# Patient Record
Sex: MALE | Race: BLACK OR AFRICAN AMERICAN | NOT HISPANIC OR LATINO | URBAN - METROPOLITAN AREA
[De-identification: names, ages, dates, MRNs, and addresses within clinical notes are randomized per-mention and may not be internally consistent; named-entity substitution may affect disease eponyms.]

---

## 2017-08-02 ENCOUNTER — INPATIENT (INPATIENT)
Facility: HOSPITAL | Age: 22
LOS: 2 days | Discharge: HOME | End: 2017-08-05
Attending: HOSPITALIST | Admitting: HOSPITALIST
Payer: MEDICAID

## 2017-08-02 VITALS
RESPIRATION RATE: 20 BRPM | OXYGEN SATURATION: 98 % | SYSTOLIC BLOOD PRESSURE: 99 MMHG | DIASTOLIC BLOOD PRESSURE: 45 MMHG | TEMPERATURE: 103 F | HEART RATE: 115 BPM

## 2017-08-02 DIAGNOSIS — B54 UNSPECIFIED MALARIA: ICD-10-CM

## 2017-08-02 DIAGNOSIS — Z29.9 ENCOUNTER FOR PROPHYLACTIC MEASURES, UNSPECIFIED: ICD-10-CM

## 2017-08-02 LAB
ALBUMIN SERPL ELPH-MCNC: 3.9 G/DL — SIGNIFICANT CHANGE UP (ref 3.3–5)
ALP SERPL-CCNC: 61 U/L — SIGNIFICANT CHANGE UP (ref 40–120)
ALT FLD-CCNC: 34 U/L — SIGNIFICANT CHANGE UP (ref 4–41)
APPEARANCE UR: CLEAR — SIGNIFICANT CHANGE UP
APTT BLD: 38.8 SEC — HIGH (ref 27.5–37.4)
AST SERPL-CCNC: 23 U/L — SIGNIFICANT CHANGE UP (ref 4–40)
B PERT DNA SPEC QL NAA+PROBE: SIGNIFICANT CHANGE UP
BASE EXCESS BLDV CALC-SCNC: 0.6 MMOL/L — SIGNIFICANT CHANGE UP
BASOPHILS # BLD AUTO: 0.01 K/UL — SIGNIFICANT CHANGE UP (ref 0–0.2)
BASOPHILS NFR BLD AUTO: 0.3 % — SIGNIFICANT CHANGE UP (ref 0–2)
BILIRUB SERPL-MCNC: 1.5 MG/DL — HIGH (ref 0.2–1.2)
BILIRUB UR-MCNC: NEGATIVE — SIGNIFICANT CHANGE UP
BLOOD GAS VENOUS - CREATININE: 1.13 MG/DL — SIGNIFICANT CHANGE UP (ref 0.5–1.3)
BLOOD UR QL VISUAL: HIGH
BUN SERPL-MCNC: 14 MG/DL — SIGNIFICANT CHANGE UP (ref 7–23)
C PNEUM DNA SPEC QL NAA+PROBE: NOT DETECTED — SIGNIFICANT CHANGE UP
CALCIUM SERPL-MCNC: 9.2 MG/DL — SIGNIFICANT CHANGE UP (ref 8.4–10.5)
CHLORIDE BLDV-SCNC: 104 MMOL/L — SIGNIFICANT CHANGE UP (ref 96–108)
CHLORIDE SERPL-SCNC: 100 MMOL/L — SIGNIFICANT CHANGE UP (ref 98–107)
CO2 SERPL-SCNC: 22 MMOL/L — SIGNIFICANT CHANGE UP (ref 22–31)
COLOR SPEC: YELLOW — SIGNIFICANT CHANGE UP
CREAT SERPL-MCNC: 1.13 MG/DL — SIGNIFICANT CHANGE UP (ref 0.5–1.3)
EOSINOPHIL # BLD AUTO: 0 K/UL — SIGNIFICANT CHANGE UP (ref 0–0.5)
EOSINOPHIL NFR BLD AUTO: 0 % — SIGNIFICANT CHANGE UP (ref 0–6)
FLUAV H1 2009 PAND RNA SPEC QL NAA+PROBE: NOT DETECTED — SIGNIFICANT CHANGE UP
FLUAV H1 RNA SPEC QL NAA+PROBE: NOT DETECTED — SIGNIFICANT CHANGE UP
FLUAV H3 RNA SPEC QL NAA+PROBE: NOT DETECTED — SIGNIFICANT CHANGE UP
FLUAV SUBTYP SPEC NAA+PROBE: SIGNIFICANT CHANGE UP
FLUBV RNA SPEC QL NAA+PROBE: NOT DETECTED — SIGNIFICANT CHANGE UP
GAS PNL BLDV: 133 MMOL/L — LOW (ref 136–146)
GLUCOSE BLDV-MCNC: 116 — HIGH (ref 70–99)
GLUCOSE SERPL-MCNC: 112 MG/DL — HIGH (ref 70–99)
GLUCOSE UR-MCNC: NEGATIVE — SIGNIFICANT CHANGE UP
HADV DNA SPEC QL NAA+PROBE: NOT DETECTED — SIGNIFICANT CHANGE UP
HCO3 BLDV-SCNC: 25 MMOL/L — SIGNIFICANT CHANGE UP (ref 20–27)
HCOV 229E RNA SPEC QL NAA+PROBE: NOT DETECTED — SIGNIFICANT CHANGE UP
HCOV HKU1 RNA SPEC QL NAA+PROBE: NOT DETECTED — SIGNIFICANT CHANGE UP
HCOV NL63 RNA SPEC QL NAA+PROBE: NOT DETECTED — SIGNIFICANT CHANGE UP
HCOV OC43 RNA SPEC QL NAA+PROBE: NOT DETECTED — SIGNIFICANT CHANGE UP
HCT VFR BLD CALC: 40.4 % — SIGNIFICANT CHANGE UP (ref 39–50)
HCT VFR BLDV CALC: 42.5 % — SIGNIFICANT CHANGE UP (ref 39–51)
HGB BLD-MCNC: 13.4 G/DL — SIGNIFICANT CHANGE UP (ref 13–17)
HGB BLDV-MCNC: 13.8 G/DL — SIGNIFICANT CHANGE UP (ref 13–17)
HIV1 AG SER QL: SIGNIFICANT CHANGE UP
HIV1+2 AB SPEC QL: SIGNIFICANT CHANGE UP
HMPV RNA SPEC QL NAA+PROBE: NOT DETECTED — SIGNIFICANT CHANGE UP
HPIV1 RNA SPEC QL NAA+PROBE: NOT DETECTED — SIGNIFICANT CHANGE UP
HPIV2 RNA SPEC QL NAA+PROBE: NOT DETECTED — SIGNIFICANT CHANGE UP
HPIV3 RNA SPEC QL NAA+PROBE: NOT DETECTED — SIGNIFICANT CHANGE UP
HPIV4 RNA SPEC QL NAA+PROBE: NOT DETECTED — SIGNIFICANT CHANGE UP
IMM GRANULOCYTES # BLD AUTO: 0.02 # — SIGNIFICANT CHANGE UP
IMM GRANULOCYTES NFR BLD AUTO: 0.5 % — SIGNIFICANT CHANGE UP (ref 0–1.5)
INR BLD: 1.35 — HIGH (ref 0.88–1.17)
KETONES UR-MCNC: NEGATIVE — SIGNIFICANT CHANGE UP
LACTATE BLDV-MCNC: 2.2 MMOL/L — HIGH (ref 0.5–2)
LEUKOCYTE ESTERASE UR-ACNC: NEGATIVE — SIGNIFICANT CHANGE UP
LYMPHOCYTES # BLD AUTO: 0.33 K/UL — LOW (ref 1–3.3)
LYMPHOCYTES # BLD AUTO: 8.3 % — LOW (ref 13–44)
M PNEUMO DNA SPEC QL NAA+PROBE: NOT DETECTED — SIGNIFICANT CHANGE UP
MCHC RBC-ENTMCNC: 26.3 PG — LOW (ref 27–34)
MCHC RBC-ENTMCNC: 33.2 % — SIGNIFICANT CHANGE UP (ref 32–36)
MCV RBC AUTO: 79.2 FL — LOW (ref 80–100)
MONOCYTES # BLD AUTO: 0.6 K/UL — SIGNIFICANT CHANGE UP (ref 0–0.9)
MONOCYTES NFR BLD AUTO: 15.1 % — HIGH (ref 2–14)
MUCOUS THREADS # UR AUTO: SIGNIFICANT CHANGE UP
NEUTROPHILS # BLD AUTO: 3.02 K/UL — SIGNIFICANT CHANGE UP (ref 1.8–7.4)
NEUTROPHILS NFR BLD AUTO: 75.8 % — SIGNIFICANT CHANGE UP (ref 43–77)
NITRITE UR-MCNC: NEGATIVE — SIGNIFICANT CHANGE UP
NRBC # FLD: 0 — SIGNIFICANT CHANGE UP
PCO2 BLDV: 37 MMHG — LOW (ref 41–51)
PH BLDV: 7.43 PH — SIGNIFICANT CHANGE UP (ref 7.32–7.43)
PH UR: 6.5 — SIGNIFICANT CHANGE UP (ref 4.6–8)
PLASMODIUM AG BLD QL: SIGNIFICANT CHANGE UP
PLATELET # BLD AUTO: 125 K/UL — LOW (ref 150–400)
PMV BLD: 11.3 FL — SIGNIFICANT CHANGE UP (ref 7–13)
PO2 BLDV: 62 MMHG — HIGH (ref 35–40)
POTASSIUM BLDV-SCNC: 3.4 MMOL/L — SIGNIFICANT CHANGE UP (ref 3.4–4.5)
POTASSIUM SERPL-MCNC: 3.5 MMOL/L — SIGNIFICANT CHANGE UP (ref 3.5–5.3)
POTASSIUM SERPL-SCNC: 3.5 MMOL/L — SIGNIFICANT CHANGE UP (ref 3.5–5.3)
PROT SERPL-MCNC: 7.8 G/DL — SIGNIFICANT CHANGE UP (ref 6–8.3)
PROT UR-MCNC: 30 — SIGNIFICANT CHANGE UP
PROTHROM AB SERPL-ACNC: 15.2 SEC — HIGH (ref 9.8–13.1)
RBC # BLD: 5.1 M/UL — SIGNIFICANT CHANGE UP (ref 4.2–5.8)
RBC # FLD: 13.4 % — SIGNIFICANT CHANGE UP (ref 10.3–14.5)
RBC CASTS # UR COMP ASSIST: SIGNIFICANT CHANGE UP (ref 0–?)
RSV RNA SPEC QL NAA+PROBE: NOT DETECTED — SIGNIFICANT CHANGE UP
RV+EV RNA SPEC QL NAA+PROBE: NOT DETECTED — SIGNIFICANT CHANGE UP
SAO2 % BLDV: 91.7 % — HIGH (ref 60–85)
SODIUM SERPL-SCNC: 139 MMOL/L — SIGNIFICANT CHANGE UP (ref 135–145)
SP GR SPEC: 1.02 — SIGNIFICANT CHANGE UP (ref 1–1.03)
SQUAMOUS # UR AUTO: SIGNIFICANT CHANGE UP
UROBILINOGEN FLD QL: 4 E.U. — HIGH (ref 0.1–0.2)
WBC # BLD: 3.98 K/UL — SIGNIFICANT CHANGE UP (ref 3.8–10.5)
WBC # FLD AUTO: 3.98 K/UL — SIGNIFICANT CHANGE UP (ref 3.8–10.5)
WBC UR QL: SIGNIFICANT CHANGE UP (ref 0–?)

## 2017-08-02 PROCEDURE — 71020: CPT | Mod: 26

## 2017-08-02 PROCEDURE — 74177 CT ABD & PELVIS W/CONTRAST: CPT | Mod: 26

## 2017-08-02 RX ORDER — ONDANSETRON 8 MG/1
4 TABLET, FILM COATED ORAL ONCE
Qty: 0 | Refills: 0 | Status: COMPLETED | OUTPATIENT
Start: 2017-08-02 | End: 2017-08-02

## 2017-08-02 RX ORDER — ACETAMINOPHEN 500 MG
650 TABLET ORAL ONCE
Qty: 0 | Refills: 0 | Status: COMPLETED | OUTPATIENT
Start: 2017-08-02 | End: 2017-08-02

## 2017-08-02 RX ORDER — ACETAMINOPHEN 500 MG
650 TABLET ORAL EVERY 6 HOURS
Qty: 0 | Refills: 0 | Status: DISCONTINUED | OUTPATIENT
Start: 2017-08-02 | End: 2017-08-05

## 2017-08-02 RX ORDER — ATOVAQUONE/PROGUANIL HCL 250-100 MG
4 TABLET ORAL
Qty: 0 | Refills: 0 | Status: COMPLETED | OUTPATIENT
Start: 2017-08-03 | End: 2017-08-04

## 2017-08-02 RX ORDER — ATOVAQUONE/PROGUANIL HCL 250-100 MG
4 TABLET ORAL ONCE
Qty: 0 | Refills: 0 | Status: COMPLETED | OUTPATIENT
Start: 2017-08-02 | End: 2017-08-02

## 2017-08-02 RX ORDER — MORPHINE SULFATE 50 MG/1
4 CAPSULE, EXTENDED RELEASE ORAL ONCE
Qty: 0 | Refills: 0 | Status: DISCONTINUED | OUTPATIENT
Start: 2017-08-02 | End: 2017-08-02

## 2017-08-02 RX ORDER — SODIUM CHLORIDE 9 MG/ML
1000 INJECTION INTRAMUSCULAR; INTRAVENOUS; SUBCUTANEOUS ONCE
Qty: 0 | Refills: 0 | Status: COMPLETED | OUTPATIENT
Start: 2017-08-02 | End: 2017-08-02

## 2017-08-02 RX ADMIN — Medication 4 TABLET(S): at 17:39

## 2017-08-02 RX ADMIN — Medication 650 MILLIGRAM(S): at 14:01

## 2017-08-02 RX ADMIN — MORPHINE SULFATE 4 MILLIGRAM(S): 50 CAPSULE, EXTENDED RELEASE ORAL at 17:39

## 2017-08-02 RX ADMIN — ONDANSETRON 4 MILLIGRAM(S): 8 TABLET, FILM COATED ORAL at 18:27

## 2017-08-02 RX ADMIN — SODIUM CHLORIDE 1000 MILLILITER(S): 9 INJECTION INTRAMUSCULAR; INTRAVENOUS; SUBCUTANEOUS at 13:41

## 2017-08-02 NOTE — ED PROVIDER NOTE - NEUROLOGICAL, MLM
Alert and oriented, no focal deficits, no motor or sensory deficits. No nuchal rigidity, neg kurnig and brudzinski

## 2017-08-02 NOTE — H&P ADULT - PROBLEM SELECTOR PLAN 1
-ID consulted, recommended malarone  -Blood cultures pending  -Tylenol PRN for fever  -Monitor daily parasite count -ID consulted and recommended malarone  -Blood cultures pending  -Tylenol PRN for fever  -Monitor daily parasite count -ID consulted and recommended malarone  -Blood cultures pending  -Tylenol PRN for fever and headache  -Monitor daily parasite count

## 2017-08-02 NOTE — ED PROVIDER NOTE - MEDICAL DECISION MAKING DETAILS
22 M recent return from [facility] one week ago, p/w fever x 3 days, 3 episodes of vomiting today, no other symptoms. Mildly tachcyardic with soft BP. No focal findings on exam, no nuchal rigidity. concern for malaria given travel and close contacts abroad with malaria and no other obvious source. Will check labs, malaria screen, cxr, ua, rvp, tylenol, ivf and d/w ID

## 2017-08-02 NOTE — ED PROVIDER NOTE - MUSCULOSKELETAL, MLM
Spine appears normal, range of motion is not limited, bilat lower back tenderness, no spinal tenderness, no CVA tenderness

## 2017-08-02 NOTE — H&P ADULT - NSHPREVIEWOFSYSTEMS_GEN_ALL_CORE
CONSTITUTIONAL:  +fever, chills, generalized malaise  HEENT:  Eyes:  No visual loss, blurred vision, double vision or yellow sclerae. Ears, Nose, Throat:  No hearing loss, sneezing, congestion, runny nose or sore throat.  SKIN:  No rash or itching.  CARDIOVASCULAR:  No chest pain, chest pressure or chest discomfort. No palpitations or edema.  RESPIRATORY:  No shortness of breath, cough or sputum.  GASTROINTESTINAL:  +generalized abdominal pain, nausea, vomiting  GENITOURINARY:  Denies hematuria, dysuria.   NEUROLOGICAL:  +headache  MUSCULOSKELETAL:  No muscle, back pain, joint pain or stiffness. +flank pain b/l  HEMATOLOGIC:  No anemia, bleeding or bruising.

## 2017-08-02 NOTE — ED PROVIDER NOTE - PROGRESS NOTE DETAILS
spoke with ID fellow, will see patient and give advice re any additional testing +malaria screen, d/w ID, will see pt in ED on re-examination patient noted to have RLQ tenderness on exam. will get CT. Patient seen by ID recommend admission and malarone 4 tabs daily x 3 days

## 2017-08-02 NOTE — H&P ADULT - HISTORY OF PRESENT ILLNESS
22 M with no PMH p/w 2 day hx of fevers, headaches, and abdominal pain. Pt did not measure his temperature at home, but felt warm with generalized malaise. He recently returned from a trip to his home in [facility] to visit his family. His mother was reportedly sick with malaria and treated with anti-malarials. He did not take any malaria ppx while there. He endorses a frontal headache associated with his fevers, no blurry vision, photophobia, or nuchal rigidity. Has also had few episodes of NBNB vomiting while at home. Abdominal pain is generalized with radiation to his flanks bilaterally. Bowel movements brown in color with normal consistency, denies melena or hematochezia. Took advil for fever at home. Felt much better at time of this encounter. Denies any cough, SOB, rhinorrhea, sore throat, chest pain, diarrhea or rash.        Vital Signs Last 24 Hrs  T(C): 37.6 (02 Aug 2017 21:12), Max: 39.4 (02 Aug 2017 12:11)  T(F): 99.6 (02 Aug 2017 21:12), Max: 103 (02 Aug 2017 12:11)  HR: 78 (02 Aug 2017 21:12) (78 - 115)  BP: 105/64 (02 Aug 2017 21:12) (99/45 - 117/61)  RR: 16 (02 Aug 2017 21:12) (16 - 20)  SpO2: 100% (02 Aug 2017 21:12) (98% - 100%)    Pt given 1L bolus in the ED

## 2017-08-02 NOTE — CONSULT NOTE ADULT - SUBJECTIVE AND OBJECTIVE BOX
HPI:  22M from [facility] since .  Lives in [place] now.  Student/[employer] .  Goes annually to [facility] to visit friends/family.  Last went from 17 to 17.  Since 2 days ago, he has been feeling ill with fever/headache.  One episode of vomiting.  Rest of ROS negative.  Here 103 fever.  Blood sent for smear which reveal intraerythrocytic ring forms c/w malaria.  We reviewed smear with parasitology.  About 2.5% parasitemia.  No LUCITA, CXR negative.  Not confused.       PAST MEDICAL & SURGICAL HISTORY:  Malaria    Allergies  No Known Allergies    ANTIMICROBIALS: none    OTHER MEDS: MEDICATIONS  (STANDING):  morphine  - Injectable 4 Once    SOCIAL HISTORY:   no IVDU. has donated blood in past.  sexually active but always uses condoms    FAMILY HISTORY:  malaria.  no TB    REVIEW OF SYSTEMS  [  ] ROS unobtainable because:    [ x ] All other systems negative except as noted below:	    Constitutional:  [x ] fever [x] decreased appetite  Skin:  [ ] rash [ ] phlebitis	  Eyes: [ ] icterus [ ] inflammation	  ENMT: [ ] discharge [ ] thrush [ ] ulcers [ ] exudates  Respiratory: [ ] dyspnea [ ] hemoptysis [ ] cough [ ] sputum	  Cardiovascular:  [ ] chest pain [ ] palpitations [ ] edema	  Gastrointestinal:  [ ] nausea [ x] vomiting [ ] diarrhea [ ] constipation [x ] pain	  Genitourinary:  [ ] dysuria [ ] frequency [ ] hematuria [ ] discharge [ ] flank pain  Musculoskeletal:  [ ] myalgias [ ] arthralgias [ ] arthritis	  Neurological:  [x ] headache [ ] seizures	 [x] photosensitivity  Psychiatric:  [ ] anxiety [ ] depression	  Hematology/Lymphatics:  [ ] lymphadenopathy  Endocrine:  [ ] adrenal [ ] thyroid  Allergic/Immunologic:	 [ ] transplant [ ] seasonal    Vital Signs Last 24 Hrs  T(F): 99.6 (17 @ 15:25), Max: 103 (17 @ 12:11)  HR: 82 (17 @ 15:25) (82 - 115)  BP: 114/62 (17 @ 15:25) (99/45 - 114/62)  RR: 18 (17 @ 15:25)  SpO2: 99% (17 @ 15:25) (98% - 99%)  Wt(kg): --    PHYSICAL EXAM:  General: non-toxic avoiding the light  HEAD/EYES: anicteric, PERRL  ENT:  supple  Cardiovascular:   S1, S2  Respiratory:  clear bilaterally  GI:  soft, normal bowel sounds, RLQ tenderness  :  no CVA tenderness   Musculoskeletal:  no synovitis  Neurologic:  grossly non-focal  Skin:  no rash  Lymph: no lymphadenopathy  Psychiatric:  appropriate affect  Vascular:  no phlebitis                        13.4   3.98  )-----------( 125      ( 02 Aug 2017 13:15 )             40.4     139  |  100  |  14  ----------------------------<  112<H>  3.5   |  22  |  1.13    Ca    9.2      02 Aug 2017 13:15    TBili  1.5  AST  23  ALT  34  AlkPhos  61    Urinalysis Basic - ( 02 Aug 2017 15:40 )  Color: YELLOW / Appearance: CLEAR / S.018 / pH: 6.5  Gluc: NEGATIVE / Ketone: NEGATIVE  / Bili: NEGATIVE / Urobili: 4 E.U.   Blood: MODERATE / Protein: 30 / Nitrite: NEGATIVE   Leuk Esterase: NEGATIVE / RBC: 10-25 / WBC 2-5   Sq Epi: OCC / Non Sq Epi: x / Bacteria: x        MICROBIOLOGY:    Malaria Screening: PARASITES SEEN (17 @ 13:25)        RADIOLOGY:   Xray Chest 2 Views PA/Lat (17 @ 14:19)  IMPRESSION: Clear lungs.

## 2017-08-02 NOTE — H&P ADULT - NSHPPHYSICALEXAM_GEN_ALL_CORE
GENERAL APPEARANCE: Well developed, well nourished, alert and cooperative. NAD.   HEENT:  NC/AT, clear conjunctiva  NECK: Neck supple, non-tender without lymphadenopathy, masses  CARDIAC: Normal S1 and S2. No S3, S4 or murmurs. RRR  LUNGS: Clear to auscultation and percussion without rales, rhonchi, wheezing or diminished breath sounds.  ABDOMEN: Soft, nondistended. Mild tenderness to palpation diffusely which patient reports is much improved.   MUSKULOSKELETAL: No joint erythema or tenderness.   EXTREMITIES: No edema.  NEUROLOGICAL: No focal neurologic deficit. Strength and sensation symmetric and intact throughout.   SKIN: Skin clean, dry, intact  PSYCHIATRIC: AOx3.Normal affect and behavior.

## 2017-08-02 NOTE — ED ADULT TRIAGE NOTE - CHIEF COMPLAINT QUOTE
pt presents via ems for evaluation of fevers, chills, flu-like symptoms, muscle aches, unsteady gait and back pain since monday after returning back from [place] x 1 week ago. denies any additional symptoms. denies any medical history.

## 2017-08-02 NOTE — ED PROVIDER NOTE - ATTENDING CONTRIBUTION TO CARE
MD Patton:  patient seen and evaluated with the resident.  I was present for key portions of the History & Physical, and I agree with the Impression & Plan.  MD Patton:  23 yo M, c/o cyclical fever.  Worried he has malaria.  No N/V.  No CP/SOB.  No cough.  No abd pain.  Mother has malaria.  Just traveled back to [facility] from a [place]endemic [place].  Bloodwork demonstrates active malaria infection w/o evidence for active hemolysis.  Impression:  active malaria infection.  Plan:  admit, antimicrobials as per ID recs.

## 2017-08-02 NOTE — ED PROVIDER NOTE - OBJECTIVE STATEMENT
22 M no sig pmh, recently returned from [facility] 1 week ago after spending 5 weeks there, and p/w fever x 3 days. Patient says was feeling well while away. No fever there, no abd pain, vomiting, diarrhea. Did not take any ppx for malaria and did not use mosquito net. mother in [facility] with malaria. [place] well when he return then 3 days ago developed chills at home. Did not take temp. When feels fever, also has frontal headache. Patient also c/o lower back pain, bilateral, better when laying flat. Fever improves with advil, but then returns. has not noticed any cyclical pattern. Had 3 episodes of emesis today. No nausea, no vomiting, no abdominal pain, no diarrhea, no cough, no dysuria/hematuria, no neck pain or stiffness, no blurry vision. Denies unprotected sex or needle use. 22 M no sig pmh, recently returned from [facility] 1 week ago after spending 5 weeks there, and p/w fever x 3 days. Patient says was feeling well while away. No fever there, no abd pain, vomiting, diarrhea. Did not take any ppx for malaria and did not use mosquito net. mother in [facility] with malaria. [place] well when he return then 3 days ago developed chills at home. Did not take temp. When feels fever, also has frontal headache. Patient also c/o lower back pain, bilateral, better when laying flat. Fever improves with advil, but then returns. has not noticed any cyclical pattern. Had 3 episodes of emesis today. No nausea, no vomiting, no abdominal pain, no diarrhea, no cough, no dysuria/hematuria, no neck pain or stiffness, no blurry vision. Denies unprotected sex or needle use. no rashes.

## 2017-08-02 NOTE — H&P ADULT - NSHPLABSRESULTS_GEN_ALL_CORE
13.4   3.98  )-----------( 125      ( 02 Aug 2017 13:15 )             40.4     malaria screen revealed: 2.5% parasitemia PLASMODIUM.FALCIPARUM        139  |  100  |  14  ----------------------------<  112<H>  3.5   |  22  |  1.13    Ca    9.2      02 Aug 2017 13:15    TPro  7.8  /  Alb  3.9  /  TBili  1.5<H>  /  DBili  x   /  AST  23  /  ALT  34  /  AlkPhos  61  08    LIVER FUNCTIONS - ( 02 Aug 2017 13:15 )  Alb: 3.9 g/dL / Pro: 7.8 g/dL / ALK PHOS: 61 u/L / ALT: 34 u/L / AST: 23 u/L / GGT: x           Lactate of 2.2    Urinalysis Basic - ( 02 Aug 2017 15:40 )    Color: YELLOW / Appearance: CLEAR / S.018 / pH: 6.5  Gluc: NEGATIVE / Ketone: NEGATIVE  / Bili: NEGATIVE / Urobili: 4 E.U.   Blood: MODERATE / Protein: 30 / Nitrite: NEGATIVE   Leuk Esterase: NEGATIVE / RBC: 10-25 / WBC 2-5   Sq Epi: OCC / Non Sq Epi: x / Bacteria: x      PT/INR - ( 02 Aug 2017 13:15 )   PT: 15.2 SEC;   INR: 1.35       PTT - ( 02 Aug 2017 13:15 )  PTT:38.8 SEC    RVP negative    CT A/P: Appendix is within normal limits. A radiopaque density within the appendix may represent ingested material versus an appendicolith.

## 2017-08-02 NOTE — CONSULT NOTE ADULT - ASSESSMENT
22M originally from [facility] with malaria - suspect falciparum, uncomplicated secondary to VFR without prophylaxis.  Cannot r/u underlying appendicitis given exam findings.    suggest:  malarone  CT abdomen/pelvis  f/u blood cultures  f/u RVP    d/w ER resident

## 2017-08-02 NOTE — ED ADULT NURSE NOTE - OBJECTIVE STATEMENT
Pt presents to room 17, A&Ox3, ambulatory at baseline without assistance, coming in for evaluation of fevers and left sided back pain x 2 days.  Reports constant back pain for the past 2 days, mild relief with advil.  denies any urinary symptoms, cough, neck pain, dizziness, joint pain, chest pain, shortness of breath.  Pt denies any medical hx.  Just returned from [place] one week prior, pts mother currently ill with malaria.  denies any mosquito bites.  IV established in left ac with a 20g, labs drawn and sent, call bell in reach, side rails up, bed in locked position, md evaluation in progress, pt on telemetry-ST @ 110 noted, will continue to monitor.

## 2017-08-03 LAB
ALBUMIN SERPL ELPH-MCNC: 3.6 G/DL — SIGNIFICANT CHANGE UP (ref 3.3–5)
ALP SERPL-CCNC: 56 U/L — SIGNIFICANT CHANGE UP (ref 40–120)
ALT FLD-CCNC: 37 U/L — SIGNIFICANT CHANGE UP (ref 4–41)
ANISOCYTOSIS BLD QL: SLIGHT — SIGNIFICANT CHANGE UP
AST SERPL-CCNC: 25 U/L — SIGNIFICANT CHANGE UP (ref 4–40)
BILIRUB SERPL-MCNC: 0.7 MG/DL — SIGNIFICANT CHANGE UP (ref 0.2–1.2)
BUN SERPL-MCNC: 10 MG/DL — SIGNIFICANT CHANGE UP (ref 7–23)
CALCIUM SERPL-MCNC: 9.3 MG/DL — SIGNIFICANT CHANGE UP (ref 8.4–10.5)
CHLORIDE SERPL-SCNC: 100 MMOL/L — SIGNIFICANT CHANGE UP (ref 98–107)
CO2 SERPL-SCNC: 28 MMOL/L — SIGNIFICANT CHANGE UP (ref 22–31)
CREAT SERPL-MCNC: 0.98 MG/DL — SIGNIFICANT CHANGE UP (ref 0.5–1.3)
EOSINOPHIL NFR FLD: 1.8 % — SIGNIFICANT CHANGE UP (ref 0–6)
GIANT PLATELETS BLD QL SMEAR: PRESENT — SIGNIFICANT CHANGE UP
GLUCOSE SERPL-MCNC: 97 MG/DL — SIGNIFICANT CHANGE UP (ref 70–99)
HCT VFR BLD CALC: 40.1 % — SIGNIFICANT CHANGE UP (ref 39–50)
HGB BLD-MCNC: 13.1 G/DL — SIGNIFICANT CHANGE UP (ref 13–17)
HYPOCHROMIA BLD QL: SLIGHT — SIGNIFICANT CHANGE UP
LACTATE SERPL-SCNC: 1 MMOL/L — SIGNIFICANT CHANGE UP (ref 0.5–2)
LYMPHOCYTES NFR SPEC AUTO: 42.5 % — SIGNIFICANT CHANGE UP (ref 13–44)
MCHC RBC-ENTMCNC: 26.6 PG — LOW (ref 27–34)
MCHC RBC-ENTMCNC: 32.7 % — SIGNIFICANT CHANGE UP (ref 32–36)
MCV RBC AUTO: 81.5 FL — SIGNIFICANT CHANGE UP (ref 80–100)
MICROCYTES BLD QL: SIGNIFICANT CHANGE UP
MONOCYTES NFR BLD: 18.6 % — HIGH (ref 2–9)
NEUTROPHIL AB SER-ACNC: 26.5 % — LOW (ref 43–77)
NRBC # FLD: 0 — SIGNIFICANT CHANGE UP
PARASITE BLD: PRESENT — SIGNIFICANT CHANGE UP
PLASMODIUM AG BLD QL: SIGNIFICANT CHANGE UP
PLATELET # BLD AUTO: 89 K/UL — LOW (ref 150–400)
PLATELET COUNT - ESTIMATE: SIGNIFICANT CHANGE UP
PMV BLD: SIGNIFICANT CHANGE UP FL (ref 7–13)
POIKILOCYTOSIS BLD QL AUTO: SLIGHT — SIGNIFICANT CHANGE UP
POTASSIUM SERPL-MCNC: 4 MMOL/L — SIGNIFICANT CHANGE UP (ref 3.5–5.3)
POTASSIUM SERPL-SCNC: 4 MMOL/L — SIGNIFICANT CHANGE UP (ref 3.5–5.3)
PROT SERPL-MCNC: 7.5 G/DL — SIGNIFICANT CHANGE UP (ref 6–8.3)
RBC # BLD: 4.92 M/UL — SIGNIFICANT CHANGE UP (ref 4.2–5.8)
RBC # FLD: SIGNIFICANT CHANGE UP % (ref 10.3–14.5)
SMUDGE CELLS # BLD: PRESENT — SIGNIFICANT CHANGE UP
SODIUM SERPL-SCNC: 137 MMOL/L — SIGNIFICANT CHANGE UP (ref 135–145)
SPECIMEN SOURCE: SIGNIFICANT CHANGE UP
SPECIMEN SOURCE: SIGNIFICANT CHANGE UP
VARIANT LYMPHS # FLD: 6.2 % — SIGNIFICANT CHANGE UP
WBC # BLD EST: SIGNIFICANT CHANGE UP
WBC # BLD: 2.1 K/UL — LOW (ref 3.8–10.5)
WBC # FLD AUTO: 2.1 K/UL — LOW (ref 3.8–10.5)

## 2017-08-03 PROCEDURE — 99223 1ST HOSP IP/OBS HIGH 75: CPT | Mod: GC

## 2017-08-03 PROCEDURE — 99232 SBSQ HOSP IP/OBS MODERATE 35: CPT

## 2017-08-03 PROCEDURE — 85060 BLOOD SMEAR INTERPRETATION: CPT

## 2017-08-03 PROCEDURE — 12345: CPT | Mod: GC,NC

## 2017-08-03 RX ADMIN — Medication 4 TABLET(S): at 17:37

## 2017-08-03 NOTE — PROGRESS NOTE ADULT - PROBLEM SELECTOR PLAN 1
-ID consulted and recommended malarone  -Blood cultures pending  -RVP neg  -Tylenol PRN for fever and headache  -Monitor daily parasite count

## 2017-08-03 NOTE — DISCHARGE NOTE ADULT - CONDITIONS AT DISCHARGE
Patient a&ox4, VSS, no acute distress noted. No s/s of pain noted at this time. Skin intact. Patient to be d/c home

## 2017-08-03 NOTE — DISCHARGE NOTE ADULT - PATIENT PORTAL LINK FT
“You can access the [facility] Patient Portal, offered by [facility], by registering with the following website: [email or web address]”

## 2017-08-03 NOTE — DISCHARGE NOTE ADULT - CARE PLAN
Principal Discharge DX:	Malaria  Goal:	To treat the malaria infection  Instructions for follow-up, activity and diet:	Regular diet, resume regular activity as tolerated Principal Discharge DX:	Malaria  Goal:	To treat the malaria infection  Instructions for follow-up, activity and diet:	You completed the treatment for your malaria infection.

## 2017-08-03 NOTE — DISCHARGE NOTE ADULT - HOSPITAL COURSE
22 M with no PMH p/w 2 day hx of fevers, headaches, and abdominal pain. Pt recently returned from a trip to his home in [place] [facility] to visit his family. His mother was reportedly sick with malaria and treated with anti-malarials. He did not take any malaria ppx while there. In the ED, pt had a fever to 103 and recieved 1 L of fluid. 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 Infectious disease was consulted and malarone was started.  Pt has remained afebrile since fever in the ED and improved clinically. Pt will finish a 3 day course of Malarone. Pt is stable and ready for discharge. 22 M with no PMH p/w 2 day hx of fevers, headaches, and abdominal pain. Pt recently returned from a trip to his home in [facility] to visit his family. His mother was reportedly sick with malaria and treated with anti-malarials. He did not take any malaria ppx while there. In the ED, pt had a fever to 103 and recieved 1 L of fluid. 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 Infectious disease was consulted and malarone was started.  Pt spiked fever to 101.5 day before discharge with symptoms of chills of HA. Parasite load at 0.05% on day of discharge and symptoms resolved. Completed a 3 day course of malarone per ID recs. Pt is stable and ready for discharge.

## 2017-08-03 NOTE — DISCHARGE NOTE ADULT - PLAN OF CARE
To treat the malaria infection Regular diet, resume regular activity as tolerated You completed the treatment for your malaria infection.

## 2017-08-03 NOTE — PROGRESS NOTE ADULT - SUBJECTIVE AND OBJECTIVE BOX
Patient is a 22y old  Male who presents with a chief complaint of Fever, headaches, abdominal pain  -  MALARIA (03 Aug 2017 00:46)      SUBJECTIVE / OVERNIGHT EVENTS: No acute events. Pt feels well. Denies any: Fevers, chills, SOB, n/v/d.    MEDICATIONS  (STANDING):  atovaquone 250 mG/proguanil 100 mG 4 Tablet(s) Oral <User Schedule>    MEDICATIONS  (PRN):  acetaminophen   Tablet 650 milliGRAM(s) Oral every 6 hours PRN For Temp greater than 38 C (100.4 F)        CAPILLARY BLOOD GLUCOSE        I&O's Summary      PHYSICAL EXAM:  GENERAL: NAD, well-developed  HEAD:  Atraumatic, Normocephalic  EYES: EOMI, PERRLA, conjunctiva and sclera clear  NECK: Supple, No JVD  CHEST/LUNG: Clear to auscultation bilaterally; No wheeze  HEART: Regular rate and rhythm; No murmurs, rubs, or gallops  ABDOMEN: Soft, Nontender, Nondistended; Bowel sounds present  EXTREMITIES:  2+ Peripheral Pulses, No clubbing, cyanosis, or edema  PSYCH: AAOx3  NEUROLOGY: non-focal  SKIN: No rashes or lesions    LABS:                        13.1   2.10  )-----------( 89       ( 03 Aug 2017 06:05 )             40.1     08-03    137  |  100  |  10  ----------------------------<  97  4.0   |  28  |  0.98    Ca    9.3      03 Aug 2017 06:05    TPro  7.5  /  Alb  3.6  /  TBili  0.7  /  DBili  x   /  AST  25  /  ALT  37  /  AlkPhos  56  08-03    PT/INR - ( 02 Aug 2017 13:15 )   PT: 15.2 SEC;   INR: 1.35          PTT - ( 02 Aug 2017 13:15 )  PTT:38.8 SEC      Urinalysis Basic - ( 02 Aug 2017 15:40 )    Color: YELLOW / Appearance: CLEAR / S.018 / pH: 6.5  Gluc: NEGATIVE / Ketone: NEGATIVE  / Bili: NEGATIVE / Urobili: 4 E.U.   Blood: MODERATE / Protein: 30 / Nitrite: NEGATIVE   Leuk Esterase: NEGATIVE / RBC: 10-25 / WBC 2-5   Sq Epi: OCC / Non Sq Epi: x / Bacteria: x        RADIOLOGY & ADDITIONAL TESTS:    Imaging Personally Reviewed:    Consultant(s) Notes Reviewed:      Care Discussed with Consultants/Other Providers:

## 2017-08-03 NOTE — PROGRESS NOTE ADULT - SUBJECTIVE AND OBJECTIVE BOX
f/u malaria    interval history/ROS:  fortunately, the CT did not confirm appendicitis.  patient reports that he vomited an hour after taking the first dose of malarone.  no abdominal pain today.  no n/v/d.  fever better and headache resolved    Allergies  No Known Allergies    ANTIMICROBIALS:   atovaquone 250 mG/proguanil 100 mG 4 <User Schedule>    MEDICATIONS  (STANDING):  acetaminophen   Tablet 650 every 6 hours PRN    Vital Signs Last 24 Hrs  T(F): 97.4 (08-03-17 @ 06:29), Max: 99.6 (08-02-17 @ 15:25)  HR: 70 (08-03-17 @ 06:29)  BP: 101/64 (08-03-17 @ 06:29)  RR: 18 (08-03-17 @ 06:29)  SpO2: 100% (08-03-17 @ 06:29) (99% - 100%)  Wt(kg): --    PHYSICAL EXAM:  General: non-toxic  HEAD/EYES: anicteric, PERRL  ENT:  supple  Cardiovascular:   S1, S2  Respiratory:  clear bilaterally  GI:  soft, normal bowel sounds, NO TENDERNESS  :  no CVA tenderness   Musculoskeletal:  no synovitis  Neurologic:  grossly non-focal  Skin:  no rash  Lymph: no lymphadenopathy  Psychiatric:  appropriate affect  Vascular:  no phlebitis                        13.1   2.10  )-----------( 89       ( 03 Aug 2017 06:05 )             40.1 08-03    137  |  100  |  10  ----------------------------<  97  4.0   |  28  |  0.98  Ca    9.3      03 Aug 2017 06:05  TPro  7.5  /  Alb  3.6  /  TBili  0.7  /  DBili  x   /  AST  25  /  ALT  37  /  AlkPhos  56  08-03    MICROBIOLOGY:  Malaria Screening: PARASITES SEEN (08.02.17 @ 13:25)    RADIOLOGY:   CT Abdomen and Pelvis w/ IV Cont (08.02.17 @ 19:17)  IMPRESSION:  Appendix is within normal limits. A radiopaque density within the appendix may represent ingested material versus an appendicolith.

## 2017-08-04 DIAGNOSIS — R10.9 UNSPECIFIED ABDOMINAL PAIN: ICD-10-CM

## 2017-08-04 LAB
ANISOCYTOSIS BLD QL: SLIGHT — SIGNIFICANT CHANGE UP
BACTERIA UR CULT: SIGNIFICANT CHANGE UP
BASOPHILS # BLD AUTO: 0.01 K/UL — SIGNIFICANT CHANGE UP (ref 0–0.2)
BASOPHILS NFR BLD AUTO: 0.3 % — SIGNIFICANT CHANGE UP (ref 0–2)
BASOPHILS NFR SPEC: 0 % — SIGNIFICANT CHANGE UP (ref 0–2)
EOSINOPHIL # BLD AUTO: 0.01 K/UL — SIGNIFICANT CHANGE UP (ref 0–0.5)
EOSINOPHIL NFR BLD AUTO: 0.3 % — SIGNIFICANT CHANGE UP (ref 0–6)
EOSINOPHIL NFR FLD: 0 % — SIGNIFICANT CHANGE UP (ref 0–6)
GIANT PLATELETS BLD QL SMEAR: PRESENT — SIGNIFICANT CHANGE UP
HCT VFR BLD CALC: 37.9 % — LOW (ref 39–50)
HGB BLD-MCNC: 12.5 G/DL — LOW (ref 13–17)
IMM GRANULOCYTES # BLD AUTO: 0.02 # — SIGNIFICANT CHANGE UP
IMM GRANULOCYTES NFR BLD AUTO: 0.6 % — SIGNIFICANT CHANGE UP (ref 0–1.5)
LYMPHOCYTES # BLD AUTO: 0.75 K/UL — LOW (ref 1–3.3)
LYMPHOCYTES # BLD AUTO: 24.4 % — SIGNIFICANT CHANGE UP (ref 13–44)
LYMPHOCYTES NFR SPEC AUTO: 22.3 % — SIGNIFICANT CHANGE UP (ref 13–44)
MCHC RBC-ENTMCNC: 25.9 PG — LOW (ref 27–34)
MCHC RBC-ENTMCNC: 33 % — SIGNIFICANT CHANGE UP (ref 32–36)
MCV RBC AUTO: 78.5 FL — LOW (ref 80–100)
MICROCYTES BLD QL: SLIGHT — SIGNIFICANT CHANGE UP
MONOCYTES # BLD AUTO: 0.55 K/UL — SIGNIFICANT CHANGE UP (ref 0–0.9)
MONOCYTES NFR BLD AUTO: 17.9 % — HIGH (ref 2–14)
MONOCYTES NFR BLD: 16.1 % — HIGH (ref 2–9)
NEUTROPHIL AB SER-ACNC: 59.8 % — SIGNIFICANT CHANGE UP (ref 43–77)
NEUTROPHILS # BLD AUTO: 1.74 K/UL — LOW (ref 1.8–7.4)
NEUTROPHILS NFR BLD AUTO: 56.5 % — SIGNIFICANT CHANGE UP (ref 43–77)
NEUTS BAND # BLD: 0.9 % — SIGNIFICANT CHANGE UP (ref 0–6)
NRBC # FLD: 0 — SIGNIFICANT CHANGE UP
PLASMODIUM AG BLD QL: SIGNIFICANT CHANGE UP
PLATELET # BLD AUTO: 94 K/UL — LOW (ref 150–400)
PLATELET COUNT - ESTIMATE: SIGNIFICANT CHANGE UP
PMV BLD: 12.7 FL — SIGNIFICANT CHANGE UP (ref 7–13)
POLYCHROMASIA BLD QL SMEAR: SIGNIFICANT CHANGE UP
RBC # BLD: 4.83 M/UL — SIGNIFICANT CHANGE UP (ref 4.2–5.8)
RBC # FLD: 13.4 % — SIGNIFICANT CHANGE UP (ref 10.3–14.5)
REVIEW TO FOLLOW: YES — SIGNIFICANT CHANGE UP
SPECIMEN SOURCE: SIGNIFICANT CHANGE UP
VARIANT LYMPHS # BLD: 0.9 % — SIGNIFICANT CHANGE UP
WBC # BLD: 3.08 K/UL — LOW (ref 3.8–10.5)
WBC # FLD AUTO: 3.08 K/UL — LOW (ref 3.8–10.5)

## 2017-08-04 PROCEDURE — 99233 SBSQ HOSP IP/OBS HIGH 50: CPT | Mod: GC

## 2017-08-04 PROCEDURE — 99232 SBSQ HOSP IP/OBS MODERATE 35: CPT

## 2017-08-04 RX ORDER — KETOROLAC TROMETHAMINE 30 MG/ML
30 SYRINGE (ML) INJECTION ONCE
Qty: 0 | Refills: 0 | Status: DISCONTINUED | OUTPATIENT
Start: 2017-08-04 | End: 2017-08-04

## 2017-08-04 RX ORDER — ONDANSETRON 8 MG/1
4 TABLET, FILM COATED ORAL EVERY 8 HOURS
Qty: 0 | Refills: 0 | Status: DISCONTINUED | OUTPATIENT
Start: 2017-08-04 | End: 2017-08-05

## 2017-08-04 RX ORDER — ACETAMINOPHEN 500 MG
650 TABLET ORAL EVERY 6 HOURS
Qty: 0 | Refills: 0 | Status: DISCONTINUED | OUTPATIENT
Start: 2017-08-04 | End: 2017-08-05

## 2017-08-04 RX ORDER — LANOLIN ALCOHOL/MO/W.PET/CERES
3 CREAM (GRAM) TOPICAL AT BEDTIME
Qty: 0 | Refills: 0 | Status: DISCONTINUED | OUTPATIENT
Start: 2017-08-04 | End: 2017-08-05

## 2017-08-04 RX ORDER — KETOROLAC TROMETHAMINE 30 MG/ML
30 SYRINGE (ML) INJECTION EVERY 8 HOURS
Qty: 0 | Refills: 0 | Status: DISCONTINUED | OUTPATIENT
Start: 2017-08-04 | End: 2017-08-05

## 2017-08-04 RX ADMIN — Medication 4 TABLET(S): at 18:23

## 2017-08-04 RX ADMIN — Medication 30 MILLIGRAM(S): at 22:00

## 2017-08-04 RX ADMIN — Medication 30 MILLIGRAM(S): at 21:45

## 2017-08-04 RX ADMIN — Medication 3 MILLIGRAM(S): at 21:49

## 2017-08-04 RX ADMIN — Medication 30 MILLIGRAM(S): at 11:13

## 2017-08-04 RX ADMIN — Medication 650 MILLIGRAM(S): at 16:01

## 2017-08-04 RX ADMIN — Medication 650 MILLIGRAM(S): at 21:30

## 2017-08-04 RX ADMIN — ONDANSETRON 4 MILLIGRAM(S): 8 TABLET, FILM COATED ORAL at 17:33

## 2017-08-04 RX ADMIN — Medication 30 MILLIGRAM(S): at 12:13

## 2017-08-04 NOTE — PROGRESS NOTE ADULT - SUBJECTIVE AND OBJECTIVE BOX
f/u malaria    interval history/ROS:  no fever. c/o chills. tired.  smear today is +    Allergies  No Known Allergies    ANTIMICROBIALS:   atovaquone 250 mG/proguanil 100 mG 4 <User Schedule>    MEDICATIONS  (STANDING):  acetaminophen   Tablet 650 every 6 hours PRN    Vital Signs Last 24 Hrs  T(F): 98.7 (08-04-17 @ 01:40), Max: 99.3 (08-03-17 @ 21:27)  HR: 72 (08-04-17 @ 06:40)  BP: 107/65 (08-04-17 @ 06:40)  RR: 18 (08-04-17 @ 06:40)  SpO2: 100% (08-04-17 @ 06:40) (100% - 100%)  Wt(kg): --    PHYSICAL EXAM:  General: non-toxic  HEAD/EYES: anicteric, PERRL  ENT:  supple  Cardiovascular:   S1, S2  Respiratory:  clear bilaterally  GI:  soft, normal bowel sounds, NO TENDERNESS  :  no CVA tenderness   Musculoskeletal:  no synovitis  Neurologic:  grossly non-focal  Skin:  no rash  Lymph: no lymphadenopathy  Psychiatric:  appropriate affect  Vascular:  no phlebitis                              12.5   3.08  )-----------( 94       ( 04 Aug 2017 05:40 )             37.9 08-03    137  |  100  |  10  ----------------------------<  97  4.0   |  28  |  0.98  Ca    9.3      03 Aug 2017 06:05  TPro  7.5  /  Alb  3.6  /  TBili  0.7  /  DBili  x   /  AST  25  /  ALT  37  /  AlkPhos  56  08-03    MICROBIOLOGY:  Malaria Screening (08.04.17 @ 05:40)    Malaria Screening: PARASITES SEEN PLASMODIUM FLACIPARUM  PARASITEMIA:1.1%    Malaria Screening (08.03.17 @ 06:05)    Malaria Screening: PARASITES SEEN PLASMODIUM FALCIPARUM  PERCENT PARASITIZED: 2.4%    Malaria Screening (08.02.17 @ 13:25)  MALARIA SCREEN: PARASITES SEEN, PLASMODIUM.FALCIPARUM  PARASETEMIA=2.55%    RADIOLOGY:   CT Abdomen and Pelvis w/ IV Cont (08.02.17 @ 19:17)  IMPRESSION:  Appendix is within normal limits. A radiopaque density within the appendix may represent ingested material versus an appendicolith.

## 2017-08-04 NOTE — DIETITIAN INITIAL EVALUATION ADULT. - OTHER INFO
Nutrition consult received for nausea and vomiting >3 days PTA; admitted for fever, headache, abdominal pain; + malaria after recent travel to [place]. Met with patient, tired and disinterested in speaking with Dietitian at this time. Denies food allergies, GI distress (nausea/vomiting/constipation/diarrhea), or issues with chewing/swallowing. Reports he is eating well without any questions or concerns for dietitian at this time. RN also reports patient observed with good intake of meals.

## 2017-08-04 NOTE — PROGRESS NOTE ADULT - SUBJECTIVE AND OBJECTIVE BOX
Patient is a 22y old  Male who presents with a chief complaint of Fever, headaches, abdominal pain  -  MALARIA (03 Aug 2017 21:03)      SUBJECTIVE / OVERNIGHT EVENTS: Pt c/o headache and right sided abd pain, but much less severe than his previous symptoms. C/o chills, nausea, but no vomiting or subjective fevers. Denies: CP, SOB, diarrhea, fevers, vomiting    MEDICATIONS  (STANDING):  atovaquone 250 mG/proguanil 100 mG 4 Tablet(s) Oral <User Schedule>    MEDICATIONS  (PRN):  acetaminophen   Tablet 650 milliGRAM(s) Oral every 6 hours PRN For Temp greater than 38 C (100.4 F)        CAPILLARY BLOOD GLUCOSE        I&O's Summary      PHYSICAL EXAM:  GENERAL: NAD, well-developed  HEAD:  Atraumatic, Normocephalic  EYES: EOMI, PERRLA, conjunctiva and sclera clear  NECK: Supple, No JVD  CHEST/LUNG: Clear to auscultation bilaterally; No wheeze  HEART: Regular rate and rhythm; No murmurs, rubs, or gallops  ABDOMEN: Soft, tender to RUQ/LUQ, no guarding Nondistended; Bowel sounds present  EXTREMITIES:  2+ Peripheral Pulses, No clubbing, cyanosis, or edema  PSYCH: AAOx3  NEUROLOGY: non-focal  SKIN: No rashes or lesions    LABS:                        13.1   2.10  )-----------( 89       ( 03 Aug 2017 06:05 )             40.1     08-03    137  |  100  |  10  ----------------------------<  97  4.0   |  28  |  0.98    Ca    9.3      03 Aug 2017 06:05    TPro  7.5  /  Alb  3.6  /  TBili  0.7  /  DBili  x   /  AST  25  /  ALT  37  /  AlkPhos  56  08-03    PT/INR - ( 02 Aug 2017 13:15 )   PT: 15.2 SEC;   INR: 1.35          PTT - ( 02 Aug 2017 13:15 )  PTT:38.8 SEC      Urinalysis Basic - ( 02 Aug 2017 15:40 )    Color: YELLOW / Appearance: CLEAR / S.018 / pH: 6.5  Gluc: NEGATIVE / Ketone: NEGATIVE  / Bili: NEGATIVE / Urobili: 4 E.U.   Blood: MODERATE / Protein: 30 / Nitrite: NEGATIVE   Leuk Esterase: NEGATIVE / RBC: 10-25 / WBC 2-5   Sq Epi: OCC / Non Sq Epi: x / Bacteria: x        RADIOLOGY & ADDITIONAL TESTS:    Imaging Personally Reviewed:    Consultant(s) Notes Reviewed:      Care Discussed with Consultants/Other Providers:

## 2017-08-04 NOTE — PROGRESS NOTE ADULT - PROBLEM SELECTOR PLAN 1
-ID consulted and recommended malarone  -Blood cultures pending, NGTD  -RVP neg  -Tylenol PRN for fever and headache  -Monitor daily parasite count

## 2017-08-04 NOTE — PROGRESS NOTE ADULT - PROBLEM SELECTOR PLAN 2
Right sided abdominal, b/t RUQ/RLQ  -no guarding, tender on palpation, no vomiting/diarrhea  -CT abd neg for appendicitis

## 2017-08-05 VITALS
OXYGEN SATURATION: 100 % | TEMPERATURE: 98 F | RESPIRATION RATE: 18 BRPM | DIASTOLIC BLOOD PRESSURE: 63 MMHG | SYSTOLIC BLOOD PRESSURE: 102 MMHG | HEART RATE: 67 BPM

## 2017-08-05 LAB
HCT VFR BLD CALC: 36.4 % — LOW (ref 39–50)
HGB BLD-MCNC: 12.3 G/DL — LOW (ref 13–17)
MCHC RBC-ENTMCNC: 26.6 PG — LOW (ref 27–34)
MCHC RBC-ENTMCNC: 33.8 % — SIGNIFICANT CHANGE UP (ref 32–36)
MCV RBC AUTO: 78.6 FL — LOW (ref 80–100)
NRBC # FLD: 0 — SIGNIFICANT CHANGE UP
PLASMODIUM AG BLD QL: SIGNIFICANT CHANGE UP
PLATELET # BLD AUTO: 94 K/UL — LOW (ref 150–400)
PMV BLD: 12.3 FL — SIGNIFICANT CHANGE UP (ref 7–13)
RBC # BLD: 4.63 M/UL — SIGNIFICANT CHANGE UP (ref 4.2–5.8)
RBC # FLD: 13.7 % — SIGNIFICANT CHANGE UP (ref 10.3–14.5)
WBC # BLD: 3.09 K/UL — LOW (ref 3.8–10.5)
WBC # FLD AUTO: 3.09 K/UL — LOW (ref 3.8–10.5)

## 2017-08-05 PROCEDURE — 99239 HOSP IP/OBS DSCHRG MGMT >30: CPT

## 2017-08-05 RX ORDER — ATOVAQUONE/PROGUANIL HCL 250-100 MG
4 TABLET ORAL DAILY
Qty: 0 | Refills: 0 | Status: DISCONTINUED | OUTPATIENT
Start: 2017-08-05 | End: 2017-08-05

## 2017-08-05 RX ORDER — ATOVAQUONE/PROGUANIL HCL 250-100 MG
4 TABLET ORAL ONCE
Qty: 0 | Refills: 0 | Status: COMPLETED | OUTPATIENT
Start: 2017-08-05 | End: 2017-08-05

## 2017-08-05 RX ADMIN — Medication 4 TABLET(S): at 18:29

## 2017-08-05 NOTE — PROGRESS NOTE ADULT - PROBLEM SELECTOR PLAN 3
-IMPROVE score of 0, no need for pharm ppx.  -Regular diet

## 2017-08-05 NOTE — PROGRESS NOTE ADULT - PROBLEM SELECTOR PLAN 1
-ID consulted and recommended malarone for 3 days  - Parasite load 0.05% today  - Currently asymptomatic  - Last day of treatment

## 2017-08-05 NOTE — PROGRESS NOTE ADULT - SUBJECTIVE AND OBJECTIVE BOX
Patient is a 22y old  Male who presents with a chief complaint of Fever, headaches, abdominal pain  -  MALARIA (03 Aug 2017 21:03)      SUBJECTIVE / OVERNIGHT EVENTS:    MEDICATIONS  (STANDING):  atovaquone 250 mG/proguanil 100 mG 4 Tablet(s) Oral once    MEDICATIONS  (PRN):  acetaminophen   Tablet 650 milliGRAM(s) Oral every 6 hours PRN For Temp greater than 38 C (100.4 F)  acetaminophen   Tablet. 650 milliGRAM(s) Oral every 6 hours PRN Mild Pain (1 - 3)  ketorolac   Injectable 30 milliGRAM(s) IV Push every 8 hours PRN Moderate Pain (4 - 6)  melatonin 3 milliGRAM(s) Oral at bedtime PRN Insomnia  ondansetron Injectable 4 milliGRAM(s) IV Push every 8 hours PRN Nausea and/or Vomiting        CAPILLARY BLOOD GLUCOSE        I&O's Summary      PHYSICAL EXAM:  GENERAL: NAD, well-developed  HEAD:  Atraumatic, Normocephalic  EYES: EOMI, PERRLA, conjunctiva and sclera clear  NECK: Supple, No JVD  CHEST/LUNG: Clear to auscultation bilaterally; No wheeze  HEART: Regular rate and rhythm; No murmurs, rubs, or gallops  ABDOMEN: Soft, Nontender, Nondistended; Bowel sounds present  EXTREMITIES:  2+ Peripheral Pulses, No clubbing, cyanosis, or edema  PSYCH: AAOx3  NEUROLOGY: non-focal  SKIN: No rashes or lesions    LABS:                        12.3   3.09  )-----------( 94       ( 05 Aug 2017 07:10 )             36.4     WBC Trend: 3.09<--, 3.08<--, 2.10<--        Creatinine Trend: 0.98<--, 1.13<--              RADIOLOGY & ADDITIONAL TESTS:    Imaging Personally Reviewed:    Consultant(s) Notes Reviewed:      Care Discussed with Consultants/Other Providers: Patient is a 22y old  Male who presents with a chief complaint of Fever, headaches, abdominal pain  -  MALARIA (03 Aug 2017 21:03)      SUBJECTIVE / OVERNIGHT EVENTS: No acute events overnight. Denies fever, chills, HA, CP, SOB, abdominal pain.     MEDICATIONS  (STANDING):  atovaquone 250 mG/proguanil 100 mG 4 Tablet(s) Oral once    MEDICATIONS  (PRN):  acetaminophen   Tablet 650 milliGRAM(s) Oral every 6 hours PRN For Temp greater than 38 C (100.4 F)  acetaminophen   Tablet. 650 milliGRAM(s) Oral every 6 hours PRN Mild Pain (1 - 3)  ketorolac   Injectable 30 milliGRAM(s) IV Push every 8 hours PRN Moderate Pain (4 - 6)  melatonin 3 milliGRAM(s) Oral at bedtime PRN Insomnia  ondansetron Injectable 4 milliGRAM(s) IV Push every 8 hours PRN Nausea and/or Vomiting        CAPILLARY BLOOD GLUCOSE        I&O's Summary      PHYSICAL EXAM:  GENERAL: NAD, well-developed  HEAD:  Atraumatic, Normocephalic  EYES: EOMI, PERRLA, conjunctiva and sclera clear  NECK: Supple, No JVD  CHEST/LUNG: Clear to auscultation bilaterally; No wheeze  HEART: Regular rate and rhythm; No murmurs, rubs, or gallops  ABDOMEN: Soft, Nontender, Nondistended; Bowel sounds present  EXTREMITIES:  2+ Peripheral Pulses, No clubbing, cyanosis, or edema  PSYCH: AAOx3  NEUROLOGY: non-focal  SKIN: No rashes or lesions    LABS:                        12.3   3.09  )-----------( 94       ( 05 Aug 2017 07:10 )             36.4     WBC Trend: 3.09<--, 3.08<--, 2.10<--        Creatinine Trend: 0.98<--, 1.13<--      Malaria screen: 0.05% parasites from 1.1%

## 2017-08-05 NOTE — PROGRESS NOTE ADULT - ASSESSMENT
22M originally from [facility] with malaria - suspect falciparum, uncomplicated secondary to VFR without prophylaxis.  Clinical improvement    suggest:  continue malarone for total of 3 days  d/c planning
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 Had fever in ED, but afebrile since. Currently on Malarone as per ID.
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 Had fever in ED, but afebrile since. Currently on Malarone as per ID. Parasite load was 2.4% on 8/3.
22M originally from [facility] with P falciparum, uncomplicated secondary to VFR without prophylaxis.  Clinically improved    suggest:  continue malarone for total of 3 days (would not count 8/2 dose as he threw up shortly after taking) - today's dose would be the second day.  d/c planning
22M originally from [facility] with P falciparum infection in setting of not taking ppx now on last day of therapy, clinically improved.

## 2017-08-05 NOTE — PROGRESS NOTE ADULT - ATTENDING COMMENTS
Pt seen and examined, chart and labs reviewed.  Pt feels much improved today.  Denies any back pain, malaise, is afebrile.   #Sepsis 2/2 malaria: sepsis now resolved.  Continue with Malarone for acute malaria treatment.  Follow parasite burden.    #Dispo: can likely d/c home tomorrow.
Pt seen and examined, chart and labs reviewed.  Pt with severe headache this morning, now relieved with IV Toradol.  Had another fever spike today.  #Sepsis 2/2 malaria: Continue with PO Malarone, ensure that patient is taking medication as he has been experiencing nausea and vomiting.      #Headache: likely 2/2 malaria.  PRN IV Toradol     #Cytopenia: 2/2 malaria.  Anticipate improvement with treatment.      #Dispo: can d/c home over weekend if symptomatically improved.
patient seen and examine at bed side   agree with above plan and management   22M originally from [facility] with P falciparum infection  continue malarone for 3 days as per ID   dc home today

## 2017-08-05 NOTE — PROGRESS NOTE ADULT - PROBLEM SELECTOR PLAN 2
Right sided abdominal, b/t RUQ/RLQ, resolved  -no guarding, tender on palpation, no vomiting/diarrhea  -CT abd neg for appendicitis

## 2017-08-07 LAB
BACTERIA BLD CULT: SIGNIFICANT CHANGE UP
BACTERIA BLD CULT: SIGNIFICANT CHANGE UP

## 2018-12-18 NOTE — PROGRESS NOTE ADULT - PROBLEM SELECTOR PROBLEM 1
-- Message is from the [facility]--    Reason for Call: Patient returning a missed call. Did not locate the encounter and patient is unsure what it may be regarding. Patient had labs drawn yesterday and a upcoming appointment 12.20.18. Please call and advise     Caller Information       Type Contact Phone    12/18/2018 01:44 PM Phone (Incoming) Sofy Abarca (Self) [phone] (M)          Alternative phone number: n/a    Turnaround time given to caller:   \"This message will be sent to [state Provider's name]. The clinical team will fulfill your request as soon as they review your message.\"    
Spoke w/pt, re; results  
Malaria

## 2019-04-15 NOTE — PATIENT PROFILE ADULT. - AS SC [facility] [employer]
[FreeTextEntry1] : Small renal mass. Discussed that there is about 70-80% chance of this potentially being consistent with kidney cancer, 20-30% benign. Discussed the imperfect nature of radiologic exams in distinguishing between benign and malignant lesions. Also discussed the imperfect nature of renal biopsy with respect to negative predictive value. Discussed options for management including observation, ablation and extirpation. Lesions less <3cm in size have low metastatic potential. Pt most interested in definitive tx which is reasonable given his young age. DIscussed partial nephrectomy via laparoscopic approach. Discussed risks including but not limited to bleeding, infection, injury to intra-abdominal contents, urine leak, conversion to radical nephrectomy conversion to open. \par --Schedule for lap partial nephrectomy\par --Medical clearance prior to surgery 
(4) no limitation
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
## 2024-06-26 NOTE — ED PROVIDER NOTE - CROS ED SKIN ALL NEG
Detail Level: Generalized General Sunscreen Counseling: I recommended a broad spectrum sunscreen with a SPF of 30 or higher.  I discussed my preference for Physical blocking/mineral sunblocks including zinc oxide or titanium dioxide. I explained that SPF 30 sunscreens block approximately 97 percent of the sun's harmful rays.  Sunscreens should be applied at least 15 minutes prior to expected sun exposure and then every 2 hours after that as long as sun exposure continues. If swimming or exercising sunscreen should be reapplied every 45 minutes to an hour after getting wet or sweating.  One ounce, or the equivalent of a shot glass full of sunscreen, is adequate to protect the skin not covered by a bathing suit. I also recommended a lip balm with a sunscreen as well. Sun protective clothing can be used in lieu of sunscreen but must be worn the entire time you are exposed to the sun's rays. negative...